# Patient Record
Sex: FEMALE | Race: WHITE
[De-identification: names, ages, dates, MRNs, and addresses within clinical notes are randomized per-mention and may not be internally consistent; named-entity substitution may affect disease eponyms.]

---

## 2018-09-21 ENCOUNTER — HOSPITAL ENCOUNTER (INPATIENT)
Dept: HOSPITAL 12 - SRC | Age: 38
LOS: 5 days | Discharge: HOME | DRG: 895 | End: 2018-09-26
Attending: INTERNAL MEDICINE | Admitting: INTERNAL MEDICINE
Payer: COMMERCIAL

## 2018-09-21 VITALS — BODY MASS INDEX: 21.85 KG/M2 | HEIGHT: 64 IN | WEIGHT: 128 LBS

## 2018-09-21 DIAGNOSIS — F17.210: ICD-10-CM

## 2018-09-21 DIAGNOSIS — Z81.1: ICD-10-CM

## 2018-09-21 DIAGNOSIS — F10.231: Primary | ICD-10-CM

## 2018-09-21 DIAGNOSIS — F41.9: ICD-10-CM

## 2018-09-21 DIAGNOSIS — G47.00: ICD-10-CM

## 2018-09-21 DIAGNOSIS — M05.9: ICD-10-CM
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
## 2018-09-21 DIAGNOSIS — K58.2: ICD-10-CM

## 2018-09-21 LAB
ALP SERPL-CCNC: 69 U/L (ref 50–136)
ALT SERPL W/O P-5'-P-CCNC: 34 U/L (ref 14–59)
AMPHETAMINES UR QL SCN>1000 NG/ML: NEGATIVE
AMYLASE SERPL-CCNC: 64 U/L (ref 25–115)
AST SERPL-CCNC: 25 U/L (ref 15–37)
BARBITURATES UR QL SCN: NEGATIVE
BASOPHILS # BLD AUTO: 0 K/UL (ref 0–8)
BASOPHILS NFR BLD AUTO: 0.6 % (ref 0–2)
BILIRUB SERPL-MCNC: 0.3 MG/DL (ref 0.2–1)
BUN SERPL-MCNC: 10 MG/DL (ref 7–18)
CHLORIDE SERPL-SCNC: 98 MMOL/L (ref 98–107)
CO2 SERPL-SCNC: 26 MMOL/L (ref 21–32)
COCAINE UR QL SCN: NEGATIVE
CREAT SERPL-MCNC: 0.7 MG/DL (ref 0.6–1.3)
EOSINOPHIL # BLD AUTO: 0 K/UL (ref 0–0.7)
EOSINOPHIL NFR BLD AUTO: 0.2 % (ref 0–7)
ETHANOL SERPL-MCNC: 295 MG/DL (ref 0–0)
GLUCOSE SERPL-MCNC: 103 MG/DL (ref 74–106)
HCG UR QL: NEGATIVE
HCT VFR BLD AUTO: 43.6 % (ref 31.2–41.9)
HGB BLD-MCNC: 15 G/DL (ref 10.9–14.3)
LIPASE SERPL-CCNC: 162 U/L (ref 73–393)
LYMPHOCYTES # BLD AUTO: 1.6 K/UL (ref 20–40)
LYMPHOCYTES NFR BLD AUTO: 18.2 % (ref 20.5–51.5)
MAGNESIUM SERPL-MCNC: 2.2 MG/DL (ref 1.8–2.4)
MCH RBC QN AUTO: 31.4 UUG (ref 24.7–32.8)
MCHC RBC AUTO-ENTMCNC: 35 G/DL (ref 32.3–35.6)
MCV RBC AUTO: 91.1 FL (ref 75.5–95.3)
MONOCYTES # BLD AUTO: 0.7 K/UL (ref 2–10)
MONOCYTES NFR BLD AUTO: 8.5 % (ref 0–11)
NEUTROPHILS # BLD AUTO: 6.2 K/UL (ref 1.8–8.9)
NEUTROPHILS NFR BLD AUTO: 72.5 % (ref 38.5–71.5)
OPIATES UR QL SCN: NEGATIVE
PCP UR QL SCN>25 NG/ML: NEGATIVE
PLATELET # BLD AUTO: 293 K/UL (ref 179–408)
POTASSIUM SERPL-SCNC: 3.6 MMOL/L (ref 3.5–5.1)
RBC # BLD AUTO: 4.78 MIL/UL (ref 3.63–4.92)
THC UR QL SCN>50 NG/ML: NEGATIVE
TSH SERPL DL<=0.005 MIU/L-ACNC: 0.73 MIU/ML (ref 0.36–3.74)
WBC # BLD AUTO: 8.5 K/UL (ref 3.8–11.8)
WS STN SPEC: 8.6 G/DL (ref 6.4–8.2)

## 2018-09-21 PROCEDURE — HZ2ZZZZ DETOXIFICATION SERVICES FOR SUBSTANCE ABUSE TREATMENT: ICD-10-PCS | Performed by: INTERNAL MEDICINE

## 2018-09-21 PROCEDURE — G0480 DRUG TEST DEF 1-7 CLASSES: HCPCS

## 2018-09-21 NOTE — NUR
PRE ASSESSMENT:

A disheveled female with flushed face and odorous of ETOH in intake with a steady gait. BP 
117/77 P 121 O2 sat 97% T 98.6 

She states she drank today until a  half an hour ago and consumed 750 ml of Vodka and 9 
glasses of wine. She states the Vodka was just today and her regular pattern is 2-3 bottles 
of wine daily. She states she has horrible DTs when she tries to stop drinking on her own 
that include hallucinations and uncontrolled vomiting. She states her legs get weak and she 
can barely walk. She admits to being intoxicated at this time but also states she is 
starting to feel uncomfortable and wants to be medicated to calm her nerves. Offered 
support. She reports Rheumatoid Arthritis and IBS. The only meds she states she takes at 
home are Trazodone 50 mg po Q bedtime and OTC stomach acid reducer. Will assess on unit.

## 2018-09-22 VITALS — DIASTOLIC BLOOD PRESSURE: 52 MMHG | SYSTOLIC BLOOD PRESSURE: 103 MMHG

## 2018-09-22 VITALS — SYSTOLIC BLOOD PRESSURE: 109 MMHG | DIASTOLIC BLOOD PRESSURE: 70 MMHG

## 2018-09-22 VITALS — SYSTOLIC BLOOD PRESSURE: 104 MMHG | DIASTOLIC BLOOD PRESSURE: 58 MMHG

## 2018-09-22 VITALS — SYSTOLIC BLOOD PRESSURE: 104 MMHG | DIASTOLIC BLOOD PRESSURE: 65 MMHG

## 2018-09-22 VITALS — SYSTOLIC BLOOD PRESSURE: 124 MMHG | DIASTOLIC BLOOD PRESSURE: 85 MMHG

## 2018-09-22 VITALS — DIASTOLIC BLOOD PRESSURE: 67 MMHG | SYSTOLIC BLOOD PRESSURE: 106 MMHG

## 2018-09-22 RX ADMIN — LORAZEPAM PRN MG: 1 TABLET ORAL at 05:56

## 2018-09-22 RX ADMIN — HYDROXYZINE PAMOATE PRN MG: 25 CAPSULE ORAL at 08:51

## 2018-09-22 RX ADMIN — HYDROXYZINE PAMOATE PRN MG: 25 CAPSULE ORAL at 18:11

## 2018-09-22 RX ADMIN — Medication PRN MG: at 20:54

## 2018-09-22 RX ADMIN — LORAZEPAM PRN MG: 1 TABLET ORAL at 10:30

## 2018-09-22 RX ADMIN — THERA TABS SCH UDTAB: TAB at 08:52

## 2018-09-22 RX ADMIN — CLONIDINE HYDROCHLORIDE PRN MG: 0.1 TABLET ORAL at 08:51

## 2018-09-22 RX ADMIN — FOLIC ACID SCH MG: 1 TABLET ORAL at 08:52

## 2018-09-22 RX ADMIN — Medication SCH MG: at 08:52

## 2018-09-22 NOTE — NUR
PRN Re-Assessment 



Pt. in bed falling asleep. No signs of distress noted. Pt. reports decreased feelings of 
anxiety. Will continue to monitor pt.'s behavior for safety.

## 2018-09-22 NOTE — NUR
Pt is A/O X 4. She reports severe anxiety,sweats,agitation and restlessness. Tremors noted 
to BUE. Her face is flushed. She is emotional and tearful. CIWA 18. PRN Ativan 2 mg PO given 
to manage s/s of w/d. Will monitor effectiveness of medication.

## 2018-09-22 NOTE — NUR
VS wnl. Pt is in bed with eyes closed. Respirations even and unlabored. ARIADNA deferred.

-------------------------------------------------------------------------------

Addendum: 09/22/18 at 0250 by WILLIE WHITLEY RN

-------------------------------------------------------------------------------

Entered in error.

## 2018-09-22 NOTE — NUR
ADMISSION:

A 37 year old female admitted for medically supervised withdrawal from ETOH. She is A/O X 4 
and her face is flushed. She is odorous of ETOH and disheveled. She laughs at inappropriate 
times and appears mildly intoxicated at this time.She states she tried to stop on her own 
but cannot as her s/s of w/d are too horrific.  She reports DT's and hallucinates with leg 
weakness and uncontrolled vomiting. She states she has been trying to get sober for the last 
10 years and can only put 9 months of sobriety together and then relapses.



SUBSTANCE USE:

She reports drinking 2 bottles of wine daily (1400 ml). since she relapsed in May (4 
months).She states she drank today until a  half an hour ago and consumed 750 ml of Vodka 
and 9 glasses of wine. She states the Vodka was just today and her regular pattern is 2 
bottles of wine daily. She states she has horrible DTs when she tries to stop drinking on 
her own that include hallucinations and uncontrolled vomiting. She states her legs get weak 
and she can barely walk. She states she has been in numerous treatment centers over the last 
10 years. [facility], formerly [facility] and [facility] are what she can remember at this time. She states she was 
at [facility] in Sept 2018 and stayed sober until May 2018. She states she has a boyfriend she 
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 She states she 
is miserable when she drinks and it is ruining her relationship.

She states she is motivated to get sober to be a better girlfriend, a better student and a 
productive member of society. She states she is miserable and needs help.



MEDICAL/ PSYCH Hx;

## 2018-09-22 NOTE — NUR
Start of Shift:

Pt. is a 38 y/o female admitted for the medically managed withdrawal from ETOH. Pt. was 
placed on on PRN medications to manage withdrawal symptoms and is awaiting further 
assessment by MD. Endorse from previous shift pt. presented with diaphoresis, anxiety, and 
restlessness during previous shift. Received pt. in room. Pt. in bed with eyes closed. 
Safety measures in place. Will continue to monitor pt.'s behavior for safety.

## 2018-09-22 NOTE — NUR
CIWA Assessment 



CIWA of 11. Pt. in room and presents with anxiety, restlessness, tremors, and diaphoresis. 
Will given PRN's as ordered. Will continue to monitor pt.'s behavior for safety.

## 2018-09-22 NOTE — NUR
PRN REASSESSMENT

PRN Trazodone is effective.Pt is calm and resting in bed with eyes closed;breathing is even 
and non labored, no s/s of distress noted,will continue to monitor.

## 2018-09-22 NOTE — NUR
START OF SHIFT

Pt  is a 38 y/o female admitted for the medically  supervised  withdrawal from ETOH. Pt is 
placed on a 3 day Valium taper  as ordered. Per report, Pt was  c/o diaphoresis, anxiety, 
and restlessness. PRN  Vistaril,Ativan and Clonidine were given and were effective. Pt 
received in room,sitting in bed.A/A/O X 4. Pt c/o feeling anxious but does not want to take 
any meds at this time.Valium 10 mg PO is soon due.Pt is also requesting for a sleep aid.All 
safety measures  are in place,call light is within reach, will continue to monitor for 
safety.

## 2018-09-22 NOTE — NUR
ADMISSION CONTINUED 



Medical Hx includes  Rheumatoid Arthritis,IBS, Anxiety and Depression. She also reports 
insomnia and takes Trazodone 50 mg PO at bedtime for sleep which she brought in. She also 
brought in OTC stomach acid reducer. She denies Psychiatric hospitalization and she denies 
S/I and H/I. She denies meds for RA or IBS.

She attributes her relapsing to trauma in her childhood and the inability to cope with 
uncomfortable feelings that come with past trauma. She is reporting she feels agitated and 
wants meds to calm her down.ARIADNA 8. Waiting on  BAL results. UDS positive for BZOS. She 
states she took Librium at home 8 days ago for 1 day  to try and detox herself but was 
unsuccessful. She refused Thiamine IM. Oriented Pt to staff and unit. She is laying in bed 
with side rails up x 2 and bed locked and low. Will provide safe and supportive environment.

-------------------------------------------------------------------------------

Addendum: 09/22/18 at 0518 by WILLIE WHITLEY RN

-------------------------------------------------------------------------------

She denies Psychiatric hospitalizations.She states she is motivated to stay sober because 
she wants to keep her boyfriend and finish her doctoral program and become a productive 
member of society. 

-------------------------------------------------------------------------------

Addendum: 09/22/18 at 0649 by WILLIE WHITLEY RN

-------------------------------------------------------------------------------

No PCP reported.

## 2018-09-22 NOTE — NUR
PRN Re-Assessment 



Pt. in room and reports a decreased in her anxiety. Medication effective. Will continue to 
monitor pt.'s behavior for safety.

## 2018-09-22 NOTE — NUR
CIWA Assessment 



CIWA of 15. Pt. in room and presents with anxiety, restlessness, tremors, and diaphoresis. 
Will give pt. medication as ordered. Will continue to monitor pt.'s behavior for safety.

## 2018-09-22 NOTE — NUR
END OF SHIFT:

Pt was admitted last night for ETOH w/d. She was intoxicated on arrival. Encouraged fluids 
and rest. She slept until about 545 this am and awoke c/o agitation , sweats,severe anxiety 
and restlessness. CIWA 18. PRN Ativan 2 mg PO given to manage s/s of w/d. She is now in bed 
with eyes closed ,respirations  even and unlabored,bed locked and low and call light in 
reach. CIWA deferred on reassessment. Will pass shift report to oncoming nurse.

## 2018-09-22 NOTE — NUR
CIWA Assessment/PRN Re-Assessment 



CIWA of 15. Pt. in room and presents with anxiety, restlessness, tremors, and diaphoresis. 
Pt. reports feeling less anxiety and agitation. Medication effective. Will continue to 
monitor pt. for safety.

## 2018-09-22 NOTE — NUR
PRN Medication 



Pt. in room complaining of increased anxiety. PRN Vistaril, and clonidine given at this 
time. Will continue to monitor pt.'s behavior for safety.

## 2018-09-22 NOTE — NUR
PRN Medication



Pt. in bed complaining of increased anxiety. PRN Vistaril given at this time. Will continue 
to monitor pt.'s behavior for safety and medication effectiveness.

## 2018-09-22 NOTE — NUR
End of Shift:

Pt. is a 36 y/o female admitted for the medically managed withdrawal from ETOH. Pt. was 
placed on a 3 day valium taper  to manage withdrawal symptoms. Throughout shift  pt. 
presented with diaphoresis, anxiety, and restlessness. Encouraged pt. to verbalize concerns 
and emotions. Safety measures in place. Will endorse pt.'s care to oncoming shift.

## 2018-09-22 NOTE — NUR
CIWA Assessment/PRN medication 



CIWA of 17. Pt. in room and presents with anxiety, restlessness, tremors, sensitivity to 
light and sound, nausea, and diaphoresis. PRN Ativan 2mg P.O. given as ordered. Will 
continue to monitor pt.'s behavior for safety and medication effectiveness.

## 2018-09-23 VITALS — DIASTOLIC BLOOD PRESSURE: 73 MMHG | SYSTOLIC BLOOD PRESSURE: 119 MMHG

## 2018-09-23 VITALS — SYSTOLIC BLOOD PRESSURE: 123 MMHG | DIASTOLIC BLOOD PRESSURE: 75 MMHG

## 2018-09-23 VITALS — SYSTOLIC BLOOD PRESSURE: 111 MMHG | DIASTOLIC BLOOD PRESSURE: 67 MMHG

## 2018-09-23 VITALS — SYSTOLIC BLOOD PRESSURE: 130 MMHG | DIASTOLIC BLOOD PRESSURE: 86 MMHG

## 2018-09-23 VITALS — DIASTOLIC BLOOD PRESSURE: 80 MMHG | SYSTOLIC BLOOD PRESSURE: 122 MMHG

## 2018-09-23 VITALS — DIASTOLIC BLOOD PRESSURE: 86 MMHG | SYSTOLIC BLOOD PRESSURE: 136 MMHG

## 2018-09-23 RX ADMIN — FOLIC ACID SCH MG: 1 TABLET ORAL at 08:39

## 2018-09-23 RX ADMIN — DIAZEPAM SCH MG: 5 TABLET ORAL at 08:39

## 2018-09-23 RX ADMIN — DIAZEPAM SCH MG: 5 TABLET ORAL at 14:57

## 2018-09-23 RX ADMIN — HYDROXYZINE PAMOATE PRN MG: 25 CAPSULE ORAL at 17:30

## 2018-09-23 RX ADMIN — Medication PRN MG: at 20:56

## 2018-09-23 RX ADMIN — Medication SCH MG: at 08:39

## 2018-09-23 RX ADMIN — DIAZEPAM SCH MG: 5 TABLET ORAL at 20:55

## 2018-09-23 RX ADMIN — THERA TABS SCH UDTAB: TAB at 08:39

## 2018-09-23 NOTE — NUR
PRN REASSESSMENT

PRN Trazodone is effective.Pt is resting in bed with eyes closed,no s/s of stress noted.

## 2018-09-23 NOTE — NUR
END OF SHIFT

Pt  is a 38 y/o female admitted for the medically  supervised  withdrawal from ETOH. Pt  
placed on a 3 day valium taper and is tolerating well.Pt is A/A/O X 4. Pt c/o feeling 
anxious  and feeling restless.PRN Trazodone and Valium 5 mg po given with good effect.Last 
CIWA=8. Pt slept 8 hours,fluid intake was 800 ml,voided x 1 and had 1x BM.All safety 
measures  are in place,call light is within reach, will continue to monitor.

## 2018-09-23 NOTE — NUR
PRN VALIUM 5 MG PO GIVEN FOR CIWA 8.PT IS VERY ANXIOUS,SOMEWHAT RESTLESS AND WORRIED THAT 
SHE MIGHT HAVE A SEIZURE.EMOTIONAL SUPPORT PROVIDED.V/S ARE NORMAL.DEEP BRTEATHING 
ENCOURAGED,WILL CONTINUE TO MONITOR FOR SAFETY.

## 2018-09-23 NOTE — NUR
START OF SHIFT:

Received Pt. A/O X 4. She presents with anxious mood and congruent affect. Her skin is moist 
and obvious sweat on forehead. Fine tremors noted to BUE. She reports sleeping well. She 
reports anxiety,restlessness and irritability. CIWA 14. Modified Valium taper in progress to 
manage s/s of w/d. Encouraged increased fluids o promote hydration. Encouraged group 
attendance to improve coping skills and prevent relapse. Will continue to monitor and offer 
support.

## 2018-09-23 NOTE — NUR
END OF SHIFT:

Pt continues on Valium taper to manage s/s of w/d which include sweats,anxiety,restlessness 
and depression. She denies S/I and H/I. Last CIWA 12. She attended a group this afternoon. 
She was isolative most of shift in her room. She states the detox meds are effective. Will 
pass shift report to oncoming night nurse.

## 2018-09-23 NOTE — NUR
START OF SHIFT

Pt  is a 36 y/o female admitted for the medically  supervised  withdrawal from ETOH. Pt is 
continues on a 3 day Valium taper as ordered. Pt received in room,sitting in bed.A/A/O X 4. 
Pt c/o feeling anxious; mood is sad and depressed; encouraged to interact with peers and 
verbalize needs and concerns.PRN meds offered  but does not want to take any meds at this 
time.Valium 5 mg PO is soon due.Last CIWA=12.Pt is also requesting for a sleep aid.All 
safety measures  are in place,call light is within reach, will continue to monitor for 
safety.

## 2018-09-23 NOTE — NUR
CIWA DEFERRED 

Pt is in deep sleep,breathing is even and non labored,no s/s distress noted,will continue tp 
monitor.

## 2018-09-24 VITALS — DIASTOLIC BLOOD PRESSURE: 85 MMHG | SYSTOLIC BLOOD PRESSURE: 129 MMHG

## 2018-09-24 VITALS — SYSTOLIC BLOOD PRESSURE: 115 MMHG | DIASTOLIC BLOOD PRESSURE: 79 MMHG

## 2018-09-24 VITALS — DIASTOLIC BLOOD PRESSURE: 80 MMHG | SYSTOLIC BLOOD PRESSURE: 113 MMHG

## 2018-09-24 VITALS — SYSTOLIC BLOOD PRESSURE: 119 MMHG | DIASTOLIC BLOOD PRESSURE: 81 MMHG

## 2018-09-24 PROCEDURE — HZ41ZZZ GROUP COUNSELING FOR SUBSTANCE ABUSE TREATMENT, BEHAVIORAL: ICD-10-PCS

## 2018-09-24 PROCEDURE — HZ31ZZZ INDIVIDUAL COUNSELING FOR SUBSTANCE ABUSE TREATMENT, BEHAVIORAL: ICD-10-PCS

## 2018-09-24 RX ADMIN — HYDROXYZINE PAMOATE PRN MG: 25 CAPSULE ORAL at 15:11

## 2018-09-24 RX ADMIN — Medication SCH MG: at 09:33

## 2018-09-24 RX ADMIN — FOLIC ACID SCH MG: 1 TABLET ORAL at 09:33

## 2018-09-24 RX ADMIN — DIAZEPAM SCH MG: 5 TABLET ORAL at 20:34

## 2018-09-24 RX ADMIN — THERA TABS SCH UDTAB: TAB at 09:33

## 2018-09-24 RX ADMIN — Medication PRN MG: at 21:49

## 2018-09-24 RX ADMIN — DIAZEPAM SCH MG: 5 TABLET ORAL at 09:33

## 2018-09-24 NOTE — NUR
PRN TRAZODONE/MILK OF MAGNESIA



Pt complains of constipation and difficulty sleeping.  PRN Trazodone and Milk of Magnesia 
administered as ordered. Safety measures in place. Will continue to monitor effectiveness.

## 2018-09-24 NOTE — NUR
Reassessment



Pt reports med has been effective partially however anxiety is increased due to recent group 
therapy.

## 2018-09-24 NOTE — NUR
PRN



Vistaril 50 mg po prn given for c/o increased anxiety, sense of panic, agitation, pt appears 
flushed and fidgety. Will monitor and reassess.

## 2018-09-24 NOTE — NUR
END OF SHIFT

Pt  is a 36 y/o female admitted for the medically  supervised  withdrawal from ETOH. Pt 
continues on a 3 day valium taper and is tolerating well.Pt is A/A/O X 4.PRN Trazodone was 
given for c/o insomnia and was effective.Last CIWA=10. Pt slept 7 hours,fluid intake was 950 
ml,voided x 1.All safety measures  are in place,call light is within reach, will continue to 
monitor.

## 2018-09-24 NOTE — NUR
Therapist prompted client to attend all group therapy sessions and client stated that she 
would be attending.

## 2018-09-24 NOTE — NUR
START OF SHIFT



Received 37 year old female patient admitted on 9/21/18 for ETOH withdrawal. Pt is alert and 
oriented x4. She is noted to be anxious, restless, irritable, and agitated. She is currently 
receiving 4 day Ativan taper and tolerating well. Per endorsement, she received PRN Vistaril 
Last CIWA:11 at 1600. Breathing is even and unlabored, safety measures in place. Will 
continue to monitor.

## 2018-09-24 NOTE — NUR
CIWA 9 



Pt has been isolative in room, has not attended afternoon group. Pt presents facial 
flushing, anxiety, agitation, irritability, restlessness, and tremors are felt. Pt 
verbalized she is stressed and worried about being able to return back to school and is 
eager to finished detox. Encouraged pt to pursue aftercare and to continue to verbalizing 
her feelings. Safety measures in place. Will continue to monitor.

## 2018-09-24 NOTE — NUR
CIWA 11



Pt was able to attend group, c/o increased anxiety and agitation, presents facial flushing, 
irritability, restlessness, and tremors are felt. Clonidine 0.1 mg po prn given prior to 
groups. Safety measures in place. Will continue to monitor.

## 2018-09-24 NOTE — NUR
CIWA

CIWA deferred due to pt being asleep.Pt is calm and and resting in bed with eyes closed,no 
s/s of distress noted,v/s refused;will continue to monitor.

## 2018-09-24 NOTE — NUR
CIWA



Pt complains of anxiety, restlessness, agitation and flushed face. CIWA:9 prior to 2100 
medication administration. Will continue to monitor.

## 2018-09-24 NOTE — NUR
End Of Shift



Pt verbalized she has been anxious about issues regarding school. Pt verbalized wanting to 
leave earlier but does not want to leave AMA. Pt was able to attend group. Last CIWA 11 
@1600. Clonidine 0.1 mg po prn given and effective. Safety measures in place.

## 2018-09-25 VITALS — DIASTOLIC BLOOD PRESSURE: 82 MMHG | SYSTOLIC BLOOD PRESSURE: 127 MMHG

## 2018-09-25 VITALS — DIASTOLIC BLOOD PRESSURE: 84 MMHG | SYSTOLIC BLOOD PRESSURE: 125 MMHG

## 2018-09-25 VITALS — DIASTOLIC BLOOD PRESSURE: 73 MMHG | SYSTOLIC BLOOD PRESSURE: 118 MMHG

## 2018-09-25 VITALS — DIASTOLIC BLOOD PRESSURE: 93 MMHG | SYSTOLIC BLOOD PRESSURE: 143 MMHG

## 2018-09-25 RX ADMIN — CLONIDINE HYDROCHLORIDE PRN MG: 0.1 TABLET ORAL at 08:57

## 2018-09-25 RX ADMIN — Medication PRN MG: at 21:31

## 2018-09-25 RX ADMIN — THERA TABS SCH UDTAB: TAB at 08:57

## 2018-09-25 RX ADMIN — Medication SCH MG: at 08:57

## 2018-09-25 RX ADMIN — FOLIC ACID SCH MG: 1 TABLET ORAL at 08:57

## 2018-09-25 RX ADMIN — HYDROXYZINE PAMOATE PRN MG: 25 CAPSULE ORAL at 23:43

## 2018-09-25 NOTE — NUR
CIWA 9 



Pt continues to have anxiety and agitation, facial flushing, irritability, restlessness, and 
tremors are felt. Pt states she is more anxious about getting a letter sent out to her 
school so they know where she was. Encouraged pt to use deep breathing exercises to promote 
relaxation. Will continue to monitor.

## 2018-09-25 NOTE — NUR
Start of Shift / CIWA 9



Pt is a 38 y/o F admitted on 09/21/18 for medically supervised ETOH withdrawal. Pt has 
completed a 3 day valium taper that ended last night and tolerated well. Pt has a flat 
affect, is guarded, facial flushing, anxiety, agitation, irritability, restlessness, and 
tremors are felt. Encouraged pt to increase fluids as tolerated and to verbalize feelings 
about situation. Last CIWA 9, slept 7 hrs, trazodone and MOM prns given last night. 
Encouraged pt to attend groups. Side rails upx2, bed in low position, call light within 
reach. Will continue to monitor.

## 2018-09-25 NOTE — NUR
END OF SHIFT



Pt is a 37 year old female patient admitted on 9/21/18 for ETOH withdrawal. Pt remains alert 
and oriented x4. She was noted to be anxious, restless, irritable, and agitated during the 
shift. She continues on a 4 day Ativan taper and tolerating well. She slept a total of 7 
hrs, Intake:1055mL, Void: x2, BM:0, Last CIWA: 9 at 2000. Breathing is even and unlabored, 
safety measures in place. Endorsed to AM shift.

## 2018-09-25 NOTE — NUR
Start of Shift

Pt is a 38 y/o F admitted  for  ETOH withdrawal. Pt has completed a 3 day Valium taper  and 
is scheduled for discharge tomorrow. Pt is A/A/O X 4, appears anxious,worried,guarded and 
preoccupied with being discharged. Last CIWA 9.PRN Clonidine was given for anxiety and was 
effective. All safety measures n place.Side rails upx2, bed in low position, call light 
within reach. Will continue to monitor.

## 2018-09-25 NOTE — NUR
CIWA

Pt c/o anxiety and is requesting for sleep medication at bed time. CIWA=8.No c/o pain noted. 
Will continue to monitor.

## 2018-09-25 NOTE — NUR
Reassessment



Pt is laying in bed with eyes closed, with lights off, appears to be sleeping. Will continue 
to monitor.

## 2018-09-25 NOTE — NUR
VITALS REFUSED, CIWA DEFERRED



0000 vitals refused. CIWA deferred d/t pt lying in bed with eyes closed noted to be asleep. 
Breathing even and unlabored, safety measures in place. Will continue to monitor.

## 2018-09-25 NOTE — NUR
Pt c/o feeling anxious regarding her discharge tomorrow and is unable to sleep.Relaxation 
techniques encouraged,will continue to monitor.

## 2018-09-25 NOTE — NUR
PRN



Clonidine 0.1 mg po prn given for c/o anxiety, agitation, restlessness. Will monitor and 
reassess.

## 2018-09-25 NOTE — NUR
VITALS REFUSED, CIWA DEFERRED



0400 vitals refused. CIWA deferred d/t pt lying in bed with eyes closed noted to be asleep. 
Breathing even and unlabored, safety measures in place. Will continue to monitor.

## 2018-09-25 NOTE — NUR
End Of Shift



Pt verbalized she has been anxious about issues about not being helped with sending an email 
to school in timely. Pt has not been attending groups. Last CIWA 9 @1600. Clonidine 0.1 mg 
po prn given and effective. Safety measures in place.

## 2018-09-25 NOTE — NUR
CIWA 9 



Pt states she does not want to attend groups until she is able to send a letter to school 
explaining her absence despite educating pt on benefits. Pt continues to have anxiety and 
agitation, facial flushing, irritability, restlessness, and tremors are felt. Encouraged pt 
to use deep breathing exercises to promote relaxation. Will continue to monitor.

## 2018-09-26 VITALS — DIASTOLIC BLOOD PRESSURE: 65 MMHG | SYSTOLIC BLOOD PRESSURE: 105 MMHG

## 2018-09-26 VITALS — DIASTOLIC BLOOD PRESSURE: 79 MMHG | SYSTOLIC BLOOD PRESSURE: 131 MMHG

## 2018-09-26 RX ADMIN — FOLIC ACID SCH MG: 1 TABLET ORAL at 08:50

## 2018-09-26 RX ADMIN — HYDROXYZINE PAMOATE PRN MG: 25 CAPSULE ORAL at 08:51

## 2018-09-26 RX ADMIN — THERA TABS SCH UDTAB: TAB at 08:50

## 2018-09-26 RX ADMIN — Medication SCH MG: at 08:50

## 2018-09-26 NOTE — NUR
END OF SHIFT

Pt is a 36 y/o F admitted  for  ETOH withdrawal. Pt has completed a 3 day Valium taper  and 
is scheduled for discharge today. Pt is A/A/O X 4, appears anxious, worried, guarded and 
preoccupied with being discharged. Last CIWA  was  8. PRN  Vistaril and Benadryl were  given 
for anxiety  and insomnia with good  effect. Pt slept 7 hours, fluid intake was 1355 
ML,voided x 2, no BM reported. All safety measures  in place.Side rails upx2, bed in low 
position, call light within reach. Will continue to monitor.

## 2018-09-26 NOTE — NUR
CIWA DEFERRED

CIWA deferred d/t pt lying in bed with eyes closed noted to be asleep. Breathing even and 
unlabored, safety measures in place,v/s refused. Will continue to monitor.

## 2018-09-26 NOTE — NUR
Discharge Note



Pt is in stable condition, vs wnl. Last CIWA 7 @0800. Pt discharge instructions given and pt 
verbalized understanding. MD aware of pt d/c. Pt left the building at 0957 on 08/26/18 with 
all belongings, prescription, home meds and discharge paperwork.

## 2018-09-26 NOTE — NUR
Reassessment



Pt verbalized med was effective in decreasing anxiety. Pt states she is ready to be 
discharged.

## 2018-09-26 NOTE — NUR
CIWA

Pt c/o anxiety, sleep medication given at bedtime is not effective.PRN Vistaril given 
earlier. CIWA=8.No c/o pain noted. Will continue to monitor.

## 2018-09-26 NOTE — NUR
PRN F/U

Pt is calm and resting in bed with eyes closed,breathing is even and non labored,no s/s of 
distress noted,will continue to monitor.

## 2018-09-26 NOTE — NUR
Start of Shift / CIWA 7



Pt is a 38 y/o F admitted on 09/21/18 for medically supervised ETOH withdrawal. Pt has 
completed a 3 day valium taper and tolerated well. Pt is medically cleared to be discharged. 
Pt presents a flat affect, anxiety, agitation, irritability, restlessness, appears flushed 
and tremors are felt. Last CIWA 8, slept 7 hrs, trazodone and vistaril prns given last 
night. Side rails upx2, bed in low position, call light within reach. Will continue to 
monitor.

## 2018-09-26 NOTE — NUR
PRN



Vistaril 50 mg po prn for c/o increased anxiety due to being discharged. Will monitor until 
discharge.

## 2020-11-11 NOTE — NUR
PRN TRAZODONE/MILK OF MAGNESIA REASSESSMENT



PRN Milk of Magnesia ineffective at this time. No BM noted. PRN Trazodone effective. Pt is 
lying in bed with eyes closed noted to be asleep. Breathing is even and unlabored, safety 
measures in place. Will monitor. [Joint Pains] : arthralgias [Appropriate Age Development] : development appropriate for age [NI] : Endocrine [Nl] : Hematologic/Lymphatic [Itching] : no itching [Redness] : no redness [Sore Throat] : no sore throat [Murmur] : no murmur [Asthma] : no asthma [Constipation] : no constipation [Bladder Infection] : no bladder infection [Joint Swelling] : no joint swelling [Muscle Aches] : no muscle aches